# Patient Record
Sex: FEMALE | Race: WHITE | NOT HISPANIC OR LATINO | Employment: OTHER | URBAN - METROPOLITAN AREA
[De-identification: names, ages, dates, MRNs, and addresses within clinical notes are randomized per-mention and may not be internally consistent; named-entity substitution may affect disease eponyms.]

---

## 2023-06-15 LAB
ALANINE AMINOTRANSFERASE (SGPT) (U/L) IN SER/PLAS: 21 U/L (ref 7–45)
ALBUMIN (G/DL) IN SER/PLAS: 4.2 G/DL (ref 3.4–5)
ALKALINE PHOSPHATASE (U/L) IN SER/PLAS: 73 U/L (ref 33–110)
ANION GAP IN SER/PLAS: 11 MMOL/L (ref 10–20)
ASPARTATE AMINOTRANSFERASE (SGOT) (U/L) IN SER/PLAS: 16 U/L (ref 9–39)
BASOPHILS (10*3/UL) IN BLOOD BY AUTOMATED COUNT: 0.06 X10E9/L (ref 0–0.1)
BASOPHILS/100 LEUKOCYTES IN BLOOD BY AUTOMATED COUNT: 1.1 % (ref 0–2)
BILIRUBIN TOTAL (MG/DL) IN SER/PLAS: 0.4 MG/DL (ref 0–1.2)
CALCIUM (MG/DL) IN SER/PLAS: 9.2 MG/DL (ref 8.6–10.3)
CARBON DIOXIDE, TOTAL (MMOL/L) IN SER/PLAS: 27 MMOL/L (ref 21–32)
CHLORIDE (MMOL/L) IN SER/PLAS: 108 MMOL/L (ref 98–107)
CHOLESTEROL IN LDL (MG/DL) IN SER/PLAS BY DIRECT ASSAY: 171 MG/DL (ref 0–129)
CREATININE (MG/DL) IN SER/PLAS: 0.68 MG/DL (ref 0.5–1.05)
EOSINOPHILS (10*3/UL) IN BLOOD BY AUTOMATED COUNT: 0.38 X10E9/L (ref 0–0.7)
EOSINOPHILS/100 LEUKOCYTES IN BLOOD BY AUTOMATED COUNT: 7.2 % (ref 0–6)
ERYTHROCYTE DISTRIBUTION WIDTH (RATIO) BY AUTOMATED COUNT: 12.1 % (ref 11.5–14.5)
ERYTHROCYTE MEAN CORPUSCULAR HEMOGLOBIN CONCENTRATION (G/DL) BY AUTOMATED: 32.1 G/DL (ref 32–36)
ERYTHROCYTE MEAN CORPUSCULAR VOLUME (FL) BY AUTOMATED COUNT: 90 FL (ref 80–100)
ERYTHROCYTES (10*6/UL) IN BLOOD BY AUTOMATED COUNT: 4.83 X10E12/L (ref 4–5.2)
GFR FEMALE: >90 ML/MIN/1.73M2
GLUCOSE (MG/DL) IN SER/PLAS: 115 MG/DL (ref 74–99)
HEMATOCRIT (%) IN BLOOD BY AUTOMATED COUNT: 43.3 % (ref 36–46)
HEMOGLOBIN (G/DL) IN BLOOD: 13.9 G/DL (ref 12–16)
IMMATURE GRANULOCYTES/100 LEUKOCYTES IN BLOOD BY AUTOMATED COUNT: 0.2 % (ref 0–0.9)
LEUKOCYTES (10*3/UL) IN BLOOD BY AUTOMATED COUNT: 5.3 X10E9/L (ref 4.4–11.3)
LYMPHOCYTES (10*3/UL) IN BLOOD BY AUTOMATED COUNT: 1.94 X10E9/L (ref 1.2–4.8)
LYMPHOCYTES/100 LEUKOCYTES IN BLOOD BY AUTOMATED COUNT: 36.7 % (ref 13–44)
MONOCYTES (10*3/UL) IN BLOOD BY AUTOMATED COUNT: 0.49 X10E9/L (ref 0.1–1)
MONOCYTES/100 LEUKOCYTES IN BLOOD BY AUTOMATED COUNT: 9.3 % (ref 2–10)
NEUTROPHILS (10*3/UL) IN BLOOD BY AUTOMATED COUNT: 2.4 X10E9/L (ref 1.2–7.7)
NEUTROPHILS/100 LEUKOCYTES IN BLOOD BY AUTOMATED COUNT: 45.5 % (ref 40–80)
PLATELETS (10*3/UL) IN BLOOD AUTOMATED COUNT: 289 X10E9/L (ref 150–450)
POTASSIUM (MMOL/L) IN SER/PLAS: 4.6 MMOL/L (ref 3.5–5.3)
PROTEIN TOTAL: 6.7 G/DL (ref 6.4–8.2)
SODIUM (MMOL/L) IN SER/PLAS: 141 MMOL/L (ref 136–145)
UREA NITROGEN (MG/DL) IN SER/PLAS: 13 MG/DL (ref 6–23)

## 2023-06-18 LAB
NIL(NEG) CONTROL SPOT COUNT: NORMAL
PANEL A SPOT COUNT: 0
PANEL B SPOT COUNT: 1
POS CONTROL SPOT COUNT: NORMAL
T-SPOT. TB INTERPRETATION: NEGATIVE

## 2024-01-23 ENCOUNTER — HOSPITAL ENCOUNTER (OUTPATIENT)
Dept: RADIOLOGY | Facility: HOSPITAL | Age: 54
Discharge: HOME | End: 2024-01-23
Payer: COMMERCIAL

## 2024-01-23 ENCOUNTER — CLINICAL SUPPORT (OUTPATIENT)
Dept: ORTHOPEDIC SURGERY | Facility: CLINIC | Age: 54
End: 2024-01-23
Payer: COMMERCIAL

## 2024-01-23 VITALS — BODY MASS INDEX: 31.15 KG/M2 | WEIGHT: 165 LBS | HEIGHT: 61 IN

## 2024-01-23 DIAGNOSIS — S83.419A SPRAIN OF MEDIAL COLLATERAL LIGAMENT OF KNEE, INITIAL ENCOUNTER: ICD-10-CM

## 2024-01-23 DIAGNOSIS — S89.91XA RIGHT KNEE INJURY, INITIAL ENCOUNTER: ICD-10-CM

## 2024-01-23 PROCEDURE — 99213 OFFICE O/P EST LOW 20 MIN: CPT

## 2024-01-23 PROCEDURE — 73564 X-RAY EXAM KNEE 4 OR MORE: CPT | Mod: RIGHT SIDE | Performed by: RADIOLOGY

## 2024-01-23 PROCEDURE — 73564 X-RAY EXAM KNEE 4 OR MORE: CPT | Mod: RT

## 2024-01-23 NOTE — PROGRESS NOTES
HPI  Faye Fajardo is a 53 y.o. female  in office today for   Chief Complaint   Patient presents with    Right Knee - Pain, Edema     Patient slipped and fell down into a ditch yesterday   .  she has been doing RICE and took some motrin for pain.  She also has a pull on brace which she states is painful. Pain mainly on the medial side of her knee and feels like the knee may go out.      Medication  No current outpatient medications on file prior to visit.     No current facility-administered medications on file prior to visit.       Physical Exam  Constitutional: well developed, well nourished female in no acute distress  Psychiatric: normal mood, appropriate affect  Eyes: sclera anicteric  HENT: normocephalic/atraumatic  CV: regular rate and rhythm   Respiratory: non labored breathing  Integumentary: no rash  Neurological: moves all extremities    Right Knee Exam     Tenderness   The patient is experiencing tenderness in the MCL.    Range of Motion   Extension:  0   Flexion:  120     Tests   Martin:  Medial - negative Lateral - negative  Varus: negative Valgus: negative  Drawer:  Anterior - negative    Posterior - negative  Patellar apprehension: negative    Other   Erythema: absent  Scars: absent  Sensation: normal  Swelling: moderate  Effusion: no effusion present              Imaging/Lab:  X-rays were taken today which were reviewed by myself and read by myself and show no acute fracture or dislocation      Assessment  Assessment: Right MCL sprain    Plan  Plan:  History, physical exam, and imaging were reviewed with patient. Placed into a brace for compression and stability, crutches to be nonweightbearing as she has pain.  As pain improves at rest, discussed progressive weightbearing as tolerated.  She should continue with RICE and antiinflammatories  Follow Up: 2-3 weeks to reassess    All questions were answered for the patient prior to end of exam and patient addressed their understanding.    Maggie  PAT [place]  01/23/24

## 2024-04-30 ENCOUNTER — OFFICE VISIT (OUTPATIENT)
Dept: UROLOGY | Facility: CLINIC | Age: 54
End: 2024-04-30
Payer: COMMERCIAL

## 2024-04-30 VITALS — DIASTOLIC BLOOD PRESSURE: 78 MMHG | SYSTOLIC BLOOD PRESSURE: 128 MMHG

## 2024-04-30 DIAGNOSIS — N39.3 STRESS INCONTINENCE: ICD-10-CM

## 2024-04-30 DIAGNOSIS — N39.41 URGE INCONTINENCE: Primary | ICD-10-CM

## 2024-04-30 LAB
POC BILIRUBIN, URINE: NEGATIVE
POC BLOOD, URINE: NEGATIVE
POC GLUCOSE, URINE: NEGATIVE MG/DL
POC KETONES, URINE: NEGATIVE MG/DL
POC LEUKOCYTES, URINE: NEGATIVE
POC NITRITE,URINE: NEGATIVE
POC PH, URINE: 5.5 PH
POC PROTEIN, URINE: NEGATIVE MG/DL
POC SPECIFIC GRAVITY, URINE: >=1.03
POC UROBILINOGEN, URINE: 0.2 EU/DL

## 2024-04-30 PROCEDURE — 81003 URINALYSIS AUTO W/O SCOPE: CPT | Performed by: STUDENT IN AN ORGANIZED HEALTH CARE EDUCATION/TRAINING PROGRAM

## 2024-04-30 PROCEDURE — 99205 OFFICE O/P NEW HI 60 MIN: CPT | Performed by: STUDENT IN AN ORGANIZED HEALTH CARE EDUCATION/TRAINING PROGRAM

## 2024-04-30 RX ORDER — RISANKIZUMAB-RZAA 150 MG/ML
INJECTION SUBCUTANEOUS
COMMUNITY
Start: 2022-06-01

## 2024-04-30 NOTE — Clinical Note
May 3, 2024       No Recipients    Patient: Faye Fajardo   YOB: 1970   Date of Visit: 4/30/2024       Dear Dr. Galan Recipients:    Thank you for referring Faye Fajardo to me for evaluation. Below are my notes for this consultation.  If you have questions, please do not hesitate to call me. I look forward to following your patient along with you.       Sincerely,     Wai Massey MD      CC:   No Recipients  ______________________________________________________________________________________    HISTORY OF PRESENT ILLNESS:  Faye Fajardo is a 53 y.o. female who presents today as a new patient for overactive bladder symptoms. She states the stress incontinence is more bothersome. She reports the voids every hour. She has increased urgency. She has a history of culture proven infections, but her last one was a few months ago. She does not have a history of breast cancer, but there is a familial one. She has had pain with sexual intercourse in the past. She reports she also has dryness with sex. She has a history of miscarriages., she has had one child vaginally. She denies any feelings of prolapse. She reports she had sympotms of a UTI but they have resolved without treatment. She is not on oxybutynin.          Past Medical History  She has a past medical history of Corneal transplant status.    Surgical History  She has a past surgical history that includes Other surgical history (06/22/2021) and Other surgical history (06/22/2021).     Social History  She reports that she has never smoked. She has never used smokeless tobacco. No history on file for alcohol use and drug use.    Family History  No family history on file.     Allergies  Augmentin [amoxicillin-pot clavulanate]        A comprehensive 10+ review of systems was negative except for: see hpi                    PHYSICAL EXAMINATION:  BP Readings from Last 3 Encounters:   04/30/24 128/78   07/29/21 106/74   07/08/20 122/80      Wt  "Readings from Last 3 Encounters:   01/23/24 74.8 kg (165 lb)   07/29/21 83.9 kg (185 lb)   07/13/21 68 kg (150 lb)      BMI: Estimated body mass index is 31.18 kg/m² as calculated from the following:    Height as of 1/23/24: 1.549 m (5' 1\").    Weight as of 1/23/24: 74.8 kg (165 lb).  BSA: Estimated body surface area is 1.79 meters squared as calculated from the following:    Height as of 1/23/24: 1.549 m (5' 1\").    Weight as of 1/23/24: 74.8 kg (165 lb).  HEENT: Normocephalic, atraumatic, PER EOMI, nonicteric, trachea normal, thyroid normal, oropharynx normal.  CARDIAC: regular rate & rhythm, S1 & S2 normal.  No heaves, thrills, gallops or murmurs.  LUNGS: Clear to auscultation, no spinal or CV tenderness.  EXTREMITIES: No evidence of cyanosis, clubbing or edema.      Pelvic:  Genitourinary:  normal external genitalia, Bartholin's glands negative, [place]'s glands negative  Urethra   normal meatus, non-tender, no periurethral mass  Vaginal mucosa  normal  Adnexae  negative nontender, no masses  Atrophy negative    CST negative  Pelvic floor muscle contraction  4/5    POP-Q (in supine position):        Aa -3     Ba -3     C -8              gh 3     pb 3     tvl 8              Ap -3     Bp -3     D -8    Rectal: no hemorrhoids, fissures or masses      IMPRESSION AND PLAN:            Assessment:  52 yo female who presents as a new patient for ADEN,     ADEN, stress dominant   -discussed mechanism of UUI and SALAZAR, and treatment options for both including PFT, pessary, sling for SALAZAR and PFT, pharmacotherapy and third-line therapy for OAB      Bulkamid is associated with slightly less success rate of a sling about 60 to 70% of women having >90% improvement. However, there seems to be similar long-term success compared to sling with fewer side-effects. Main AE is urinary retention which resolves within 24 hours of using a 10-12 Polish catheter.  I discussed that if she still has some leakage after her procedure, she could " perform another injection within 4 weeks and this procedure being performed in the office.        We discussed the sling procedure in depth with her there is a long-term success rate of 70-80% complete continence and up to 90% significant improvement up to 10 years after surgery, though the sling is meant to last a lifetime, there is a <5% risk of subsequent surgery, either revision or excision within 9 years. The major complications include bladder perforation with sling placement <1%, retention requiring sling lysis 1-3%, transient retention requiring 2-3 days of catheter drainage 33%, and mesh erosion 1-3%.      UDS ordered    Will try PT as well     Informational handouts provided    Tentatively scheduled for sling in September    All questions and concerns were answered and addressed.  The patient expressed understanding and agrees with the plan.     Wai Massey MD    Scribe Attestation  By signing my name below, I, La Nena Frias, [place]   attest that this documentation has been prepared under the direction and in the presence of Wai Massey MD.

## 2024-04-30 NOTE — PROGRESS NOTES
"HISTORY OF PRESENT ILLNESS:  Faye Fajardo is a 53 y.o. female who presents today as a new patient for overactive bladder symptoms. She states the stress incontinence is more bothersome. She reports the voids every hour. She has increased urgency. She has a history of culture proven infections, but her last one was a few months ago. She does not have a history of breast cancer, but there is a familial one. She has had pain with sexual intercourse in the past. She reports she also has dryness with sex. She has a history of miscarriages., she has had one child vaginally. She denies any feelings of prolapse. She reports she had sympotms of a UTI but they have resolved without treatment. She is not on oxybutynin.          Past Medical History  She has a past medical history of Corneal transplant status.    Surgical History  She has a past surgical history that includes Other surgical history (06/22/2021) and Other surgical history (06/22/2021).     Social History  She reports that she has never smoked. She has never used smokeless tobacco. No history on file for alcohol use and drug use.    Family History  No family history on file.     Allergies  Augmentin [amoxicillin-pot clavulanate]        A comprehensive 10+ review of systems was negative except for: see hpi                    PHYSICAL EXAMINATION:  BP Readings from Last 3 Encounters:   04/30/24 128/78   07/29/21 106/74   07/08/20 122/80      Wt Readings from Last 3 Encounters:   01/23/24 74.8 kg (165 lb)   07/29/21 83.9 kg (185 lb)   07/13/21 68 kg (150 lb)      BMI: Estimated body mass index is 31.18 kg/m² as calculated from the following:    Height as of 1/23/24: 1.549 m (5' 1\").    Weight as of 1/23/24: 74.8 kg (165 lb).  BSA: Estimated body surface area is 1.79 meters squared as calculated from the following:    Height as of 1/23/24: 1.549 m (5' 1\").    Weight as of 1/23/24: 74.8 kg (165 lb).  HEENT: Normocephalic, atraumatic, PER EOMI, nonicteric, trachea " normal, thyroid normal, oropharynx normal.  CARDIAC: regular rate & rhythm, S1 & S2 normal.  No heaves, thrills, gallops or murmurs.  LUNGS: Clear to auscultation, no spinal or CV tenderness.  EXTREMITIES: No evidence of cyanosis, clubbing or edema.      Pelvic:  Genitourinary:  normal external genitalia, Bartholin's glands negative, [place]'s glands negative  Urethra   normal meatus, non-tender, no periurethral mass  Vaginal mucosa  normal  Adnexae  negative nontender, no masses  Atrophy negative    CST negative  Pelvic floor muscle contraction  4/5    POP-Q (in supine position):        Aa -3     Ba -3     C -8              gh 3     pb 3     tvl 8              Ap -3     Bp -3     D -8    Rectal: no hemorrhoids, fissures or masses      IMPRESSION AND PLAN:            Assessment:  52 yo female who presents as a new patient for ADEN,     ADEN, stress dominant   -discussed mechanism of UUI and SALAZAR, and treatment options for both including PFT, pessary, sling for SALAZAR and PFT, pharmacotherapy and third-line therapy for OAB      Bulkamid is associated with slightly less success rate of a sling about 60 to 70% of women having >90% improvement. However, there seems to be similar long-term success compared to sling with fewer side-effects. Main AE is urinary retention which resolves within 24 hours of using a 10-12 [place] catheter.  I discussed that if she still has some leakage after her procedure, she could perform another injection within 4 weeks and this procedure being performed in the office.        We discussed the sling procedure in depth with her there is a long-term success rate of 70-80% complete continence and up to 90% significant improvement up to 10 years after surgery, though the sling is meant to last a lifetime, there is a <5% risk of subsequent surgery, either revision or excision within 9 years. The major complications include bladder perforation with sling placement <1%, retention requiring sling lysis 1-3%,  transient retention requiring 2-3 days of catheter drainage 33%, and mesh erosion 1-3%.      UDS ordered    Will try PT as well     Informational handouts provided    Tentatively scheduled for sling in September    All questions and concerns were answered and addressed.  The patient expressed understanding and agrees with the plan.     Wai Massey MD    Scribe Attestation  By signing my name below, I, La Nena Juarezus, Scribe   attest that this documentation has been prepared under the direction and in the presence of Wai Massey MD.

## 2024-05-03 PROBLEM — N39.3 STRESS INCONTINENCE: Status: ACTIVE | Noted: 2024-04-30

## 2024-05-03 RX ORDER — ACETAMINOPHEN 325 MG/1
975 TABLET ORAL ONCE
OUTPATIENT
Start: 2024-05-03 | End: 2024-05-03

## 2024-05-03 RX ORDER — CELECOXIB 400 MG/1
400 CAPSULE ORAL ONCE
OUTPATIENT
Start: 2024-05-03 | End: 2024-05-03

## 2024-05-03 RX ORDER — CEFAZOLIN SODIUM 2 G/100ML
2 INJECTION, SOLUTION INTRAVENOUS ONCE
OUTPATIENT
Start: 2024-05-03 | End: 2024-05-03

## 2024-05-21 ENCOUNTER — OFFICE VISIT (OUTPATIENT)
Dept: UROLOGY | Facility: CLINIC | Age: 54
End: 2024-05-21
Payer: COMMERCIAL

## 2024-05-21 ENCOUNTER — PROCEDURE VISIT (OUTPATIENT)
Dept: UROLOGY | Facility: CLINIC | Age: 54
End: 2024-05-21
Payer: COMMERCIAL

## 2024-05-21 DIAGNOSIS — N32.81 OAB (OVERACTIVE BLADDER): Primary | ICD-10-CM

## 2024-05-21 DIAGNOSIS — R39.15 URINARY URGENCY: ICD-10-CM

## 2024-05-21 DIAGNOSIS — N39.3 SUI (STRESS URINARY INCONTINENCE, FEMALE): ICD-10-CM

## 2024-05-21 PROCEDURE — 51728 CYSTOMETROGRAM W/VP: CPT | Performed by: STUDENT IN AN ORGANIZED HEALTH CARE EDUCATION/TRAINING PROGRAM

## 2024-05-21 PROCEDURE — 51741 ELECTRO-UROFLOWMETRY FIRST: CPT | Performed by: STUDENT IN AN ORGANIZED HEALTH CARE EDUCATION/TRAINING PROGRAM

## 2024-05-21 PROCEDURE — 51797 INTRAABDOMINAL PRESSURE TEST: CPT | Performed by: STUDENT IN AN ORGANIZED HEALTH CARE EDUCATION/TRAINING PROGRAM

## 2024-05-21 PROCEDURE — 99214 OFFICE O/P EST MOD 30 MIN: CPT | Performed by: STUDENT IN AN ORGANIZED HEALTH CARE EDUCATION/TRAINING PROGRAM

## 2024-05-21 PROCEDURE — 51784 ANAL/URINARY MUSCLE STUDY: CPT | Performed by: STUDENT IN AN ORGANIZED HEALTH CARE EDUCATION/TRAINING PROGRAM

## 2024-05-21 PROCEDURE — 51729 CYSTOMETROGRAM W/VP&UP: CPT | Performed by: STUDENT IN AN ORGANIZED HEALTH CARE EDUCATION/TRAINING PROGRAM

## 2024-05-21 RX ORDER — MIRABEGRON 50 MG/1
50 TABLET, EXTENDED RELEASE ORAL DAILY
Qty: 30 TABLET | Refills: 11 | Status: SHIPPED | OUTPATIENT
Start: 2024-05-21 | End: 2025-05-16

## 2024-05-21 NOTE — PROGRESS NOTES
Urodynamic Study:  Faye Fajardo identified using 2 forms of identification. A timeout was completed, patient is in the correct position and all safety precautions have been taken.   Risks, Benefits and Alternatives:  Risks, benefits and alternatives were discussed in detail. The patient appears to understand and agrees to proceed. Faye Fajardo has completed, signed and dated the procedure consent form.    Uroflow completed.  Using sterile technique, a 7fr Air Charge Catheter was inserted into the bladder. Rectal catheter inserted approximately 15cm  Bladder filled with normal saline at a rate of 49.9ml/min  130 ml of normal saline instilled in bladder prior to voiding.   Results:  Post void residual  (PVR)  volume of 50 ml.      Patient here for urodynamic study. Discussed procedure. Bladder diary reviewed. Patient has OAB symptoms. She has stress incontinence. She complains of having frequency and urgency, voiding every hour. She does not feel a bulge. Performed UDS without difficulty. I was not able to do a cough stress with her today as she had urgency and then started leaking. She does however state that she does leak when she coughs and laughs. Instructed patient to increase fluid intake.

## 2024-05-21 NOTE — PROGRESS NOTES
"HISTORY OF PRESENT ILLNESS:  Faye Fajardo is a 53 y.o. female who presents today for a follow up visit. She has not gone to physical therapy, she is not interested in this,.  UDS demonstrates detrusor overactivity and does not demonstrate stress incontinence.  She does endorse leakage with stress incontinence.  She is also bothered by the urge, she tried oxybutynin which caused severe side effects.         Past Medical History  She has a past medical history of Corneal transplant status.    Surgical History  She has a past surgical history that includes Other surgical history (06/22/2021) and Other surgical history (06/22/2021).     Social History  She reports that she has never smoked. She has never used smokeless tobacco. No history on file for alcohol use and drug use.    Family History  No family history on file.     Allergies  Augmentin [amoxicillin-pot clavulanate]      A comprehensive 10+ review of systems was negative except for: see hpi                          PHYSICAL EXAMINATION:  BP Readings from Last 3 Encounters:   04/30/24 128/78   07/29/21 106/74   07/08/20 122/80      Wt Readings from Last 3 Encounters:   01/23/24 74.8 kg (165 lb)   07/29/21 83.9 kg (185 lb)   07/13/21 68 kg (150 lb)      BMI: Estimated body mass index is 31.18 kg/m² as calculated from the following:    Height as of 1/23/24: 1.549 m (5' 1\").    Weight as of 1/23/24: 74.8 kg (165 lb).  BSA: Estimated body surface area is 1.79 meters squared as calculated from the following:    Height as of 1/23/24: 1.549 m (5' 1\").    Weight as of 1/23/24: 74.8 kg (165 lb).  HEENT: Normocephalic, atraumatic, PER EOMI, nonicteric, trachea normal, thyroid normal, oropharynx normal.  CARDIAC: regular rate & rhythm, S1 & S2 normal.  No heaves, thrills, gallops or murmurs.  LUNGS: Clear to auscultation, no spinal or CV tenderness.  EXTREMITIES: No evidence of cyanosis, clubbing or edema.               Assessment:  54 yo female who presents for ADEN, "      ADEN, stress dominant     UDS shows chata detrusor overactivity with leakage,    Has failed oxybutynin we will try Myrbetriq    She is tentatively scheduled for sling in September, will hold this for now    We discussed third-line therapy as well    We discussed botox vs sacral neuromodulation: both have similar efficacy 80% patients reports >50% improvement, botox associated with 5% risk of incomplete emptying, increase in UTI and will require re-injection in 6-9 months; and as early as 3 months. SNM is a staged procedure, 2 weeks apart, consisting first of lead implantation then internalization of IPG if there is improvement. Interstim is associated with lead migration, explantation, infection and bleeding, though risks are all <5%. We also discussed PTNS which is associated with success rates comparable to medical therapy but without side-effects without significant major morbidity.     Follow-up in 4 to 6-week      All questions and concerns were answered and addressed.  The patient expressed understanding and agrees with the plan.     Wai Massey MD    Scribe Attestation  By signing my name below, I, La Nena Frias, Scribe   attest that this documentation has been prepared under the direction and in the presence of Wai Massey MD.

## 2024-07-02 ENCOUNTER — APPOINTMENT (OUTPATIENT)
Dept: UROLOGY | Facility: CLINIC | Age: 54
End: 2024-07-02
Payer: COMMERCIAL

## 2024-07-02 DIAGNOSIS — N32.81 OAB (OVERACTIVE BLADDER): Primary | ICD-10-CM

## 2024-07-02 PROCEDURE — 99442 PR PHYS/QHP TELEPHONE EVALUATION 11-20 MIN: CPT | Performed by: STUDENT IN AN ORGANIZED HEALTH CARE EDUCATION/TRAINING PROGRAM

## 2024-07-02 RX ORDER — FESOTERODINE FUMARATE 8 MG/1
8 TABLET, FILM COATED, EXTENDED RELEASE ORAL DAILY
Qty: 30 TABLET | Refills: 11 | Status: SHIPPED | OUTPATIENT
Start: 2024-07-02 | End: 2025-07-02

## 2024-07-02 NOTE — PROGRESS NOTES
Virtual or Telephone Consent    A telephone visit (audio only) between the patient (at the originating site) and the provider (at the distant site) was utilized to provide this telehealth service.   Verbal consent was requested and obtained from Faye Fajardo on this date, 07/05/24 for a telehealth visit.       HISTORY OF PRESENT ILLNESS:  Faye Fajardo is a 53 y.o. female who presents today for a virtual follow up visit. She reports it took 2 to 3 weeks for her to get her Myrbetriq filled and that it cost her over 300 dollars for 90 days.          Past Medical History  She has a past medical history of Corneal transplant status.    Surgical History  She has a past surgical history that includes Other surgical history (06/22/2021) and Other surgical history (06/22/2021).     Social History  She reports that she has never smoked. She has never used smokeless tobacco. No history on file for alcohol use and drug use.    Family History  No family history on file.     Allergies  Augmentin [amoxicillin-pot clavulanate]      A comprehensive 10+ review of systems was negative except for: see hpi                    Assessment:  52 yo female who presents for ADEN,      ADEN, stress dominant      UDS shows detrusor overactivity with leakage,     Has failed oxybutynin, myrbetriq worked somewhat but was too expensive, try fesoterodine     She is tentatively scheduled for sling in September, may hold off on sling if fesoterodine works         Follow-up in 8 weeks     I spent a total of 11 minutes speaking with the patient on the telephone    All questions and concerns were answered and addressed.  The patient expressed understanding and agrees with the plan.     Wai Massey MD    Scribe Attestation  By signing my name below, ILa Nena Scribe   attest that this documentation has been prepared under the direction and in the presence of Wai Massey MD.

## 2024-09-13 ENCOUNTER — CLINICAL SUPPORT (OUTPATIENT)
Dept: PREADMISSION TESTING | Facility: HOSPITAL | Age: 54
End: 2024-09-13
Payer: COMMERCIAL

## 2024-09-13 RX ORDER — CYANOCOBALAMIN (VITAMIN B-12) 250 MCG
250 TABLET ORAL 2 TIMES WEEKLY
COMMUNITY

## 2024-09-13 NOTE — PREPROCEDURE INSTRUCTIONS
No outpatient medications have been marked as taking for the 9/13/24 encounter (Clinical Support) with [facility].                       NPO Instructions:    Nothing to eat or drink after midnight  Hydrate well the day before  No medications morning of surgery    Additional Instructions:     Wear  comfortable loose fitting clothing  Do not use moisturizers, creams, lotions or perfume  All jewelry and valuables should be left at home  Shower morning of surgery deodorant   Call with any [phone]

## 2024-09-17 ENCOUNTER — ANESTHESIA EVENT (OUTPATIENT)
Dept: OPERATING ROOM | Facility: HOSPITAL | Age: 54
End: 2024-09-17
Payer: COMMERCIAL

## 2024-09-18 ENCOUNTER — APPOINTMENT (OUTPATIENT)
Dept: CARDIOLOGY | Facility: HOSPITAL | Age: 54
End: 2024-09-18
Payer: COMMERCIAL

## 2024-09-18 ENCOUNTER — HOSPITAL ENCOUNTER (OUTPATIENT)
Facility: HOSPITAL | Age: 54
Setting detail: OUTPATIENT SURGERY
Discharge: HOME | End: 2024-09-18
Attending: STUDENT IN AN ORGANIZED HEALTH CARE EDUCATION/TRAINING PROGRAM | Admitting: STUDENT IN AN ORGANIZED HEALTH CARE EDUCATION/TRAINING PROGRAM
Payer: COMMERCIAL

## 2024-09-18 ENCOUNTER — PHARMACY VISIT (OUTPATIENT)
Dept: PHARMACY | Facility: CLINIC | Age: 54
End: 2024-09-18
Payer: COMMERCIAL

## 2024-09-18 ENCOUNTER — ANESTHESIA (OUTPATIENT)
Dept: OPERATING ROOM | Facility: HOSPITAL | Age: 54
End: 2024-09-18
Payer: COMMERCIAL

## 2024-09-18 VITALS
SYSTOLIC BLOOD PRESSURE: 108 MMHG | DIASTOLIC BLOOD PRESSURE: 74 MMHG | TEMPERATURE: 97.6 F | HEART RATE: 58 BPM | WEIGHT: 165 LBS | BODY MASS INDEX: 31.15 KG/M2 | HEIGHT: 61 IN | RESPIRATION RATE: 11 BRPM | OXYGEN SATURATION: 95 %

## 2024-09-18 DIAGNOSIS — G89.18 POSTOPERATIVE PAIN: ICD-10-CM

## 2024-09-18 DIAGNOSIS — N39.3 STRESS INCONTINENCE: ICD-10-CM

## 2024-09-18 DIAGNOSIS — N39.41 URGE INCONTINENCE: Primary | ICD-10-CM

## 2024-09-18 PROBLEM — R11.2 PONV (POSTOPERATIVE NAUSEA AND VOMITING): Status: ACTIVE | Noted: 2024-09-18

## 2024-09-18 PROBLEM — Z98.890 PONV (POSTOPERATIVE NAUSEA AND VOMITING): Status: ACTIVE | Noted: 2024-09-18

## 2024-09-18 LAB
ANION GAP SERPL CALC-SCNC: 8 MMOL/L (ref 10–20)
ATRIAL RATE: 70 BPM
BUN SERPL-MCNC: 10 MG/DL (ref 6–23)
CALCIUM SERPL-MCNC: 8.7 MG/DL (ref 8.6–10.3)
CHLORIDE SERPL-SCNC: 108 MMOL/L (ref 98–107)
CO2 SERPL-SCNC: 26 MMOL/L (ref 21–32)
CREAT SERPL-MCNC: 0.65 MG/DL (ref 0.5–1.05)
EGFRCR SERPLBLD CKD-EPI 2021: >90 ML/MIN/1.73M*2
ERYTHROCYTE [DISTWIDTH] IN BLOOD BY AUTOMATED COUNT: 11.9 % (ref 11.5–14.5)
GLUCOSE SERPL-MCNC: 120 MG/DL (ref 74–99)
HCT VFR BLD AUTO: 42.3 % (ref 36–46)
HGB BLD-MCNC: 14.3 G/DL (ref 12–16)
MCH RBC QN AUTO: 29.5 PG (ref 26–34)
MCHC RBC AUTO-ENTMCNC: 33.8 G/DL (ref 32–36)
MCV RBC AUTO: 87 FL (ref 80–100)
NRBC BLD-RTO: 0 /100 WBCS (ref 0–0)
P AXIS: 30 DEGREES
PLATELET # BLD AUTO: 303 X10*3/UL (ref 150–450)
POTASSIUM SERPL-SCNC: 4.1 MMOL/L (ref 3.5–5.3)
PR INTERVAL: 153 MS
Q ONSET: 253 MS
QRS COUNT: 11 BEATS
QRS DURATION: 89 MS
QT INTERVAL: 394 MS
QTC CALCULATION(BAZETT): 429 MS
QTC FREDERICIA: 416 MS
R AXIS: -25 DEGREES
RBC # BLD AUTO: 4.84 X10*6/UL (ref 4–5.2)
SODIUM SERPL-SCNC: 138 MMOL/L (ref 136–145)
T AXIS: 13 DEGREES
T OFFSET: 450 MS
VENTRICULAR RATE: 71 BPM
WBC # BLD AUTO: 4.4 X10*3/UL (ref 4.4–11.3)

## 2024-09-18 PROCEDURE — 2500000005 HC RX 250 GENERAL PHARMACY W/O HCPCS: Performed by: STUDENT IN AN ORGANIZED HEALTH CARE EDUCATION/TRAINING PROGRAM

## 2024-09-18 PROCEDURE — 2500000004 HC RX 250 GENERAL PHARMACY W/ HCPCS (ALT 636 FOR OP/ED): Performed by: ANESTHESIOLOGY

## 2024-09-18 PROCEDURE — 82374 ASSAY BLOOD CARBON DIOXIDE: CPT | Performed by: ANESTHESIOLOGY

## 2024-09-18 PROCEDURE — 7100000010 HC PHASE TWO TIME - EACH INCREMENTAL 1 MINUTE: Performed by: STUDENT IN AN ORGANIZED HEALTH CARE EDUCATION/TRAINING PROGRAM

## 2024-09-18 PROCEDURE — 36415 COLL VENOUS BLD VENIPUNCTURE: CPT | Performed by: ANESTHESIOLOGY

## 2024-09-18 PROCEDURE — 3600000008 HC OR TIME - EACH INCREMENTAL 1 MINUTE - PROCEDURE LEVEL THREE: Performed by: STUDENT IN AN ORGANIZED HEALTH CARE EDUCATION/TRAINING PROGRAM

## 2024-09-18 PROCEDURE — C1771 REP DEV, URINARY, W/SLING: HCPCS | Performed by: STUDENT IN AN ORGANIZED HEALTH CARE EDUCATION/TRAINING PROGRAM

## 2024-09-18 PROCEDURE — 2500000004 HC RX 250 GENERAL PHARMACY W/ HCPCS (ALT 636 FOR OP/ED): Performed by: NURSE ANESTHETIST, CERTIFIED REGISTERED

## 2024-09-18 PROCEDURE — 57288 REPAIR BLADDER DEFECT: CPT | Performed by: STUDENT IN AN ORGANIZED HEALTH CARE EDUCATION/TRAINING PROGRAM

## 2024-09-18 PROCEDURE — 7100000009 HC PHASE TWO TIME - INITIAL BASE CHARGE: Performed by: STUDENT IN AN ORGANIZED HEALTH CARE EDUCATION/TRAINING PROGRAM

## 2024-09-18 PROCEDURE — 3700000002 HC GENERAL ANESTHESIA TIME - EACH INCREMENTAL 1 MINUTE: Performed by: STUDENT IN AN ORGANIZED HEALTH CARE EDUCATION/TRAINING PROGRAM

## 2024-09-18 PROCEDURE — 96372 THER/PROPH/DIAG INJ SC/IM: CPT | Performed by: NURSE ANESTHETIST, CERTIFIED REGISTERED

## 2024-09-18 PROCEDURE — 85027 COMPLETE CBC AUTOMATED: CPT | Performed by: ANESTHESIOLOGY

## 2024-09-18 PROCEDURE — 2500000001 HC RX 250 WO HCPCS SELF ADMINISTERED DRUGS (ALT 637 FOR MEDICARE OP): Performed by: STUDENT IN AN ORGANIZED HEALTH CARE EDUCATION/TRAINING PROGRAM

## 2024-09-18 PROCEDURE — 2780000003 HC OR 278 NO HCPCS: Performed by: STUDENT IN AN ORGANIZED HEALTH CARE EDUCATION/TRAINING PROGRAM

## 2024-09-18 PROCEDURE — 2500000004 HC RX 250 GENERAL PHARMACY W/ HCPCS (ALT 636 FOR OP/ED): Mod: JZ | Performed by: STUDENT IN AN ORGANIZED HEALTH CARE EDUCATION/TRAINING PROGRAM

## 2024-09-18 PROCEDURE — 7100000001 HC RECOVERY ROOM TIME - INITIAL BASE CHARGE: Performed by: STUDENT IN AN ORGANIZED HEALTH CARE EDUCATION/TRAINING PROGRAM

## 2024-09-18 PROCEDURE — 2720000007 HC OR 272 NO HCPCS: Performed by: STUDENT IN AN ORGANIZED HEALTH CARE EDUCATION/TRAINING PROGRAM

## 2024-09-18 PROCEDURE — 93010 ELECTROCARDIOGRAM REPORT: CPT | Performed by: INTERNAL MEDICINE

## 2024-09-18 PROCEDURE — RXMED WILLOW AMBULATORY MEDICATION CHARGE

## 2024-09-18 PROCEDURE — 7100000002 HC RECOVERY ROOM TIME - EACH INCREMENTAL 1 MINUTE: Performed by: STUDENT IN AN ORGANIZED HEALTH CARE EDUCATION/TRAINING PROGRAM

## 2024-09-18 PROCEDURE — 3700000001 HC GENERAL ANESTHESIA TIME - INITIAL BASE CHARGE: Performed by: STUDENT IN AN ORGANIZED HEALTH CARE EDUCATION/TRAINING PROGRAM

## 2024-09-18 PROCEDURE — 3600000003 HC OR TIME - INITIAL BASE CHARGE - PROCEDURE LEVEL THREE: Performed by: STUDENT IN AN ORGANIZED HEALTH CARE EDUCATION/TRAINING PROGRAM

## 2024-09-18 PROCEDURE — 2500000005 HC RX 250 GENERAL PHARMACY W/O HCPCS: Performed by: NURSE ANESTHETIST, CERTIFIED REGISTERED

## 2024-09-18 PROCEDURE — 51702 INSERT TEMP BLADDER CATH: CPT | Mod: CCI

## 2024-09-18 PROCEDURE — 2500000001 HC RX 250 WO HCPCS SELF ADMINISTERED DRUGS (ALT 637 FOR MEDICARE OP): Performed by: ANESTHESIOLOGY

## 2024-09-18 PROCEDURE — 93005 ELECTROCARDIOGRAM TRACING: CPT

## 2024-09-18 RX ORDER — SODIUM CITRATE AND CITRIC ACID MONOHYDRATE 334; 500 MG/5ML; MG/5ML
30 SOLUTION ORAL ONCE
Status: COMPLETED | OUTPATIENT
Start: 2024-09-18 | End: 2024-09-18

## 2024-09-18 RX ORDER — LIDOCAINE HYDROCHLORIDE AND EPINEPHRINE 10; 10 MG/ML; UG/ML
INJECTION, SOLUTION INFILTRATION; PERINEURAL AS NEEDED
Status: DISCONTINUED | OUTPATIENT
Start: 2024-09-18 | End: 2024-09-18 | Stop reason: HOSPADM

## 2024-09-18 RX ORDER — CEFAZOLIN SODIUM 2 G/100ML
2 INJECTION, SOLUTION INTRAVENOUS EVERY 8 HOURS
Status: DISCONTINUED | OUTPATIENT
Start: 2024-09-18 | End: 2024-09-18 | Stop reason: HOSPADM

## 2024-09-18 RX ORDER — POLYETHYLENE GLYCOL 3350 17 G/17G
17 POWDER, FOR SOLUTION ORAL DAILY
Qty: 510 G | Refills: 0 | Status: SHIPPED | OUTPATIENT
Start: 2024-09-18 | End: 2024-10-18

## 2024-09-18 RX ORDER — ACETAMINOPHEN 325 MG/1
975 TABLET ORAL ONCE
Status: COMPLETED | OUTPATIENT
Start: 2024-09-18 | End: 2024-09-18

## 2024-09-18 RX ORDER — PHENYLEPHRINE HYDROCHLORIDE 10 MG/ML
INJECTION INTRAVENOUS AS NEEDED
Status: DISCONTINUED | OUTPATIENT
Start: 2024-09-18 | End: 2024-09-18

## 2024-09-18 RX ORDER — MIDAZOLAM HYDROCHLORIDE 1 MG/ML
INJECTION, SOLUTION INTRAMUSCULAR; INTRAVENOUS AS NEEDED
Status: DISCONTINUED | OUTPATIENT
Start: 2024-09-18 | End: 2024-09-18

## 2024-09-18 RX ORDER — CELECOXIB 200 MG/1
400 CAPSULE ORAL ONCE
Status: COMPLETED | OUTPATIENT
Start: 2024-09-18 | End: 2024-09-18

## 2024-09-18 RX ORDER — LIDOCAINE HCL/PF 100 MG/5ML
SYRINGE (ML) INTRAVENOUS AS NEEDED
Status: DISCONTINUED | OUTPATIENT
Start: 2024-09-18 | End: 2024-09-18

## 2024-09-18 RX ORDER — DOCUSATE SODIUM 100 MG/1
100 CAPSULE, LIQUID FILLED ORAL 2 TIMES DAILY
Qty: 60 CAPSULE | Refills: 0 | Status: SHIPPED | OUTPATIENT
Start: 2024-09-18 | End: 2024-10-18

## 2024-09-18 RX ORDER — KETOROLAC TROMETHAMINE 30 MG/ML
INJECTION, SOLUTION INTRAMUSCULAR; INTRAVENOUS AS NEEDED
Status: DISCONTINUED | OUTPATIENT
Start: 2024-09-18 | End: 2024-09-18

## 2024-09-18 RX ORDER — METOCLOPRAMIDE HYDROCHLORIDE 5 MG/ML
10 INJECTION INTRAMUSCULAR; INTRAVENOUS ONCE
Status: COMPLETED | OUTPATIENT
Start: 2024-09-18 | End: 2024-09-18

## 2024-09-18 RX ORDER — HYDROMORPHONE HYDROCHLORIDE 0.2 MG/ML
0.2 INJECTION INTRAMUSCULAR; INTRAVENOUS; SUBCUTANEOUS EVERY 5 MIN PRN
Status: DISCONTINUED | OUTPATIENT
Start: 2024-09-18 | End: 2024-09-18 | Stop reason: HOSPADM

## 2024-09-18 RX ORDER — TRAMADOL HYDROCHLORIDE 50 MG/1
50 TABLET ORAL EVERY 6 HOURS PRN
Qty: 3 TABLET | Refills: 0 | Status: SHIPPED | OUTPATIENT
Start: 2024-09-18

## 2024-09-18 RX ORDER — FAMOTIDINE 10 MG/ML
20 INJECTION INTRAVENOUS ONCE
Status: COMPLETED | OUTPATIENT
Start: 2024-09-18 | End: 2024-09-18

## 2024-09-18 RX ORDER — FENTANYL CITRATE 50 UG/ML
INJECTION, SOLUTION INTRAMUSCULAR; INTRAVENOUS AS NEEDED
Status: DISCONTINUED | OUTPATIENT
Start: 2024-09-18 | End: 2024-09-18

## 2024-09-18 RX ORDER — SODIUM CHLORIDE, SODIUM LACTATE, POTASSIUM CHLORIDE, CALCIUM CHLORIDE 600; 310; 30; 20 MG/100ML; MG/100ML; MG/100ML; MG/100ML
50 INJECTION, SOLUTION INTRAVENOUS CONTINUOUS
Status: DISCONTINUED | OUTPATIENT
Start: 2024-09-18 | End: 2024-09-18 | Stop reason: HOSPADM

## 2024-09-18 RX ORDER — ONDANSETRON HYDROCHLORIDE 2 MG/ML
4 INJECTION, SOLUTION INTRAVENOUS ONCE
Status: COMPLETED | OUTPATIENT
Start: 2024-09-18 | End: 2024-09-18

## 2024-09-18 RX ORDER — CEFAZOLIN SODIUM 2 G/100ML
2 INJECTION, SOLUTION INTRAVENOUS ONCE
Status: COMPLETED | OUTPATIENT
Start: 2024-09-18 | End: 2024-09-18

## 2024-09-18 RX ORDER — SCOLOPAMINE TRANSDERMAL SYSTEM 1 MG/1
1 PATCH, EXTENDED RELEASE TRANSDERMAL ONCE
Status: DISCONTINUED | OUTPATIENT
Start: 2024-09-18 | End: 2024-09-18 | Stop reason: HOSPADM

## 2024-09-18 RX ORDER — PROPOFOL 10 MG/ML
INJECTION, EMULSION INTRAVENOUS AS NEEDED
Status: DISCONTINUED | OUTPATIENT
Start: 2024-09-18 | End: 2024-09-18

## 2024-09-18 RX ORDER — ONDANSETRON HYDROCHLORIDE 2 MG/ML
4 INJECTION, SOLUTION INTRAVENOUS ONCE AS NEEDED
Status: COMPLETED | OUTPATIENT
Start: 2024-09-18 | End: 2024-09-18

## 2024-09-18 RX ORDER — ACETAMINOPHEN 500 MG
1000 TABLET ORAL EVERY 6 HOURS PRN
Qty: 30 TABLET | Refills: 0 | Status: SHIPPED | OUTPATIENT
Start: 2024-09-18

## 2024-09-18 RX ORDER — KETOROLAC TROMETHAMINE 10 MG/1
10 TABLET, FILM COATED ORAL EVERY 6 HOURS PRN
Qty: 20 TABLET | Refills: 0 | Status: SHIPPED | OUTPATIENT
Start: 2024-09-18

## 2024-09-18 SDOH — HEALTH STABILITY: MENTAL HEALTH: CURRENT SMOKER: 0

## 2024-09-18 ASSESSMENT — PAIN SCALES - GENERAL
PAINLEVEL_OUTOF10: 2
PAINLEVEL_OUTOF10: 0 - NO PAIN
PAINLEVEL_OUTOF10: 2
PAINLEVEL_OUTOF10: 2
PAIN_LEVEL: 5
PAINLEVEL_OUTOF10: 5 - MODERATE PAIN
PAINLEVEL_OUTOF10: 7
PAINLEVEL_OUTOF10: 7
PAINLEVEL_OUTOF10: 2
PAINLEVEL_OUTOF10: 7
PAINLEVEL_OUTOF10: 5 - MODERATE PAIN
PAINLEVEL_OUTOF10: 2
PAINLEVEL_OUTOF10: 2
PAINLEVEL_OUTOF10: 4
PAINLEVEL_OUTOF10: 3
PAINLEVEL_OUTOF10: 2

## 2024-09-18 ASSESSMENT — COLUMBIA-SUICIDE SEVERITY RATING SCALE - C-SSRS
1. IN THE PAST MONTH, HAVE YOU WISHED YOU WERE DEAD OR WISHED YOU COULD GO TO SLEEP AND NOT WAKE UP?: NO
2. HAVE YOU ACTUALLY HAD ANY THOUGHTS OF KILLING YOURSELF?: NO
6. HAVE YOU EVER DONE ANYTHING, STARTED TO DO ANYTHING, OR PREPARED TO DO ANYTHING TO END YOUR LIFE?: NO

## 2024-09-18 ASSESSMENT — PAIN - FUNCTIONAL ASSESSMENT: PAIN_FUNCTIONAL_ASSESSMENT: 0-10

## 2024-09-18 NOTE — ANESTHESIA POSTPROCEDURE EVALUATION
Patient: Faye Fajardo    Procedure Summary       Date: 09/18/24 Room / Location: [facility] / Virtual [facility]    Anesthesia Start: 1220 Anesthesia Stop: 1323    Procedure: Desara sling placement Diagnosis:       Stress incontinence      (Stress incontinence [N39.3])    Surgeons: Wai Massey MD Responsible Provider: ADRIANNA Morris    Anesthesia Type: general ASA Status: 2            Anesthesia Type: general    Vitals Value Taken Time   /70 09/18/24 1420   Temp 36.7 °C (98 °F) 09/18/24 1410   Pulse 64 09/18/24 1422   Resp 6 09/18/24 1422   SpO2 98 % 09/18/24 1422   Vitals shown include unfiled device data.    Anesthesia Post Evaluation    Patient location during evaluation: PACU  Patient participation: complete - patient participated  Level of consciousness: awake and alert  Pain score: 5  Pain management: adequate  Airway patency: patent  Cardiovascular status: hemodynamically stable  Respiratory status: room air  Hydration status: acceptable  Postoperative Nausea and Vomiting: mild  Comments: PT'S VITAL SIGNS DO NOT REFLECT THE INTENSITY OF PAIN PROCLAIMED    There were no known notable events for this encounter.

## 2024-09-18 NOTE — OP NOTE
Desara sling placement Operative Note     Date: 2024  OR Location: [facility]    Name: Faye Fajardo, : 1970, Age: 53 y.o., MRN: [id number], Sex: female    Diagnosis  Pre-op Diagnosis      * Stress incontinence [N39.3] Post-op Diagnosis     * Stress incontinence [N39.3]     Procedures  Desara sling placement  90770 - PA SLING OPERATION STRESS INCONTINENCE      Surgeons      * Wai Massey - Primary    Resident/Fellow/Other Assistant:  Surgeons and Role:  * No surgeons found with a matching role *    Procedure Summary  Anesthesia: General  ASA: II  Anesthesia Staff: CRNA: ADRIANNA Morris  Estimated Blood Loss: 20 mL  Intra-op Medications:   Administrations occurring from 1041 to 1141 on 24:   Medication Name Total Dose   lactated Ringer's infusion 13.33 mL   scopolamine (Transderm-Scop) patch 1 patch 1 patch   acetaminophen (Tylenol) tablet 975 mg 975 mg   celecoxib (CeleBREX) capsule 400 mg 400 mg   dexAMETHasone (Decadron) injection 8 mg 8 mg   famotidine PF (Pepcid) injection 20 mg 20 mg   metoclopramide (Reglan) injection 10 mg 10 mg   ondansetron (Zofran) injection 4 mg 4 mg   sodium citrate-citric acid (Bicitra) solution 30 mL 30 mL              Anesthesia Record               Intraprocedure I/O Totals          Intake    ceFAZolin (Ancef) 2 g in dextrose (iso)  mL 100.00 mL    Total Intake 100 mL       Output    Est. Blood Loss 20 mL    Total Output 20 mL       Net    Net Volume 80 mL          Specimen: No specimens collected     Staff:   Circulator: Edna  Scrub Person: Linda  Scrub Person: Maria De Jesus         Drains and/or Catheters:   Urethral Catheter Non-latex 16 Fr. (Active)       Tourniquet Times:         Implants:     Findings: normal bladder    Indications: Faye Fajardo is an 53 y.o. female who is having surgery for Stress incontinence [N39.3].     The patient was seen in the preoperative area. The risks, benefits, complications, treatment options, non-operative  alternatives, expected recovery and outcomes were discussed with the patient. The possibilities of reaction to medication, pulmonary aspiration, injury to surrounding structures, bleeding, recurrent infection, the need for additional procedures, failure to diagnose a condition, and creating a complication requiring transfusion or operation were discussed with the patient. The patient concurred with the proposed plan, giving informed consent.  The site of surgery was properly noted/marked if necessary per policy. The patient has been actively warmed in preoperative area. Preoperative antibiotics are not indicated. Venous thrombosis prophylaxis have been ordered including bilateral sequential compression devices    Procedure Details: Two sites were identified with each site 2.5 cm. from the midline at the level of the symphysis pubis. 1% lidocaine with epinephrine was injected vaginally under the urethra in the vaginal epithelium and bilaterally in the direction of two periurethral tunnels that were about to be created for the sling.  At this point, a 2 cm. sagittal excision was performed vaginally, beginning 1 cm. beneath the urethral meatus. Sharp dissection was carried out bilaterally using Metzenbaum scissors and periurethral tunnels were created bilaterally.  A Seo catheter with a catheter guide was inserted into the bladder.   The TVT trocars were passed vaginally and retropubically with bladder deviation to the opposite side until it appeared suprapubically through a stab incision. The catheter was removed.  A cystoscopy was performed, which was entirely normal.  The bladder was drained.   The trocars were brought up entirely suprapubically and excised.  The end of the mesh was held in place with a Betty clamp.   The end of the mesh was held in place with a Betty clamp. Tension was adjusted on the mesh by drawing up on the suprapubic ends with a #8 Hegar dilator maintained between the tape and the urethra.  The plastic sheaths were removed bilaterally.  The excess mesh was excised suprapubically.  The suprapubic sites were closed with interrupted stitches of 4-0 Vicryl suture.  The vaginal incision was closed with a running stitch of 0 Vicryl suture.    Complications:  None; patient tolerated the procedure well.    Disposition: PACU - hemodynamically stable.  Condition: stable         Additional Details:     Attending Attestation: I performed the procedure.    Wai Massey  Phone Number: [phone]

## 2024-09-18 NOTE — ANESTHESIA PROCEDURE NOTES
Airway  Date/Time: 9/18/2024 12:34 PM  Urgency: elective    Airway not difficult    Staffing  Performed: CRNA   Authorized by: ADRIANNA Morris    Performed by: ADRIANNA Morris  Patient location during procedure: OR    Indications and Patient Condition  Indications for airway management: anesthesia  Sedation level: deep  Preoxygenated: yes  Patient position: sniffing  Mask difficulty assessment: 1 - vent by mask    Final Airway Details  Final airway type: supraglottic airway      Successful airway: Supraglottic airway: i gel.  Size 4     Number of attempts at approach: 1  Number of other approaches attempted: 0

## 2024-09-18 NOTE — H&P
"           HISTORY OF PRESENT ILLNESS:  This is a  53 y.o. y.o. who presents with SALAZAR here for a sling          Past Medical History  She has a past medical history of Clotting disorder (Multi), Corneal transplant status, Dental disease, PONV (postoperative nausea and vomiting), and Vision loss.    Surgical History  She has a past surgical history that includes Other surgical history (06/22/2021); Other surgical history (06/22/2021); Endometrial ablation; Dilation and curettage of uterus; and Corneal transplant (04/2005).     Social History  She reports that she has never smoked. She has never used smokeless tobacco. She reports that she does not drink alcohol and does not use drugs.    Family History  Family History   Problem Relation Name Age of Onset    Breast cancer Mother      Deep vein thrombosis Father      Coronary artery disease Father          bypass 20 plus years ago    Lymphoma Father      Heart attack Father          silent        Allergies  Augmentin [amoxicillin-pot clavulanate] and Tetracycline        A comprehensive 10+ review of systems was negative except for: see hpi                    PHYSICAL EXAMINATION:  BP Readings from Last 3 Encounters:   09/18/24 108/82   04/30/24 128/78   07/29/21 106/74      Wt Readings from Last 3 Encounters:   09/18/24 74.8 kg (165 lb)   01/23/24 74.8 kg (165 lb)   07/29/21 83.9 kg (185 lb)      BMI: Estimated body mass index is 31.18 kg/m² as calculated from the following:    Height as of this encounter: 1.549 m (5' 1\").    Weight as of this encounter: 74.8 kg (165 lb).  BSA: Estimated body surface area is 1.79 meters squared as calculated from the following:    Height as of this encounter: 1.549 m (5' 1\").    Weight as of this encounter: 74.8 kg (165 lb).  HEENT: Normocephalic, atraumatic, PER EOMI, nonicteric, trachea normal, thyroid normal, oropharynx normal.  CARDIAC: regular rate & rhythm, S1 & S2 normal.  No heaves, thrills, gallops or murmurs.  LUNGS: Clear to " auscultation, no spinal or CV tenderness.  EXTREMITIES: No evidence of cyanosis, clubbing or edema.            IMPRESSION AND PLAN:  Faye Fajardo is a 53 y.o. who presents with SALAZAR for a sling       All questions and concerns were answered and addressed.  The patient expressed understanding and agrees with the plan.     9/18/2024

## 2024-09-18 NOTE — ANESTHESIA PREPROCEDURE EVALUATION
Patient: Faye Fajardo    Procedure Information       Date/Time: 09/18/24 1041    Procedure: Desara sling placement - ~30 min for procedure    Location: [facility] / [facility]    Surgeons: Wai Massey MD            Relevant Problems   Anesthesia   (+) PONV (postoperative nausea and vomiting)       Clinical information reviewed:   Tobacco  Allergies  Meds  Problems  Med Hx  Surg Hx  OB Status    Fam Hx  Soc Hx        NPO Detail:  NPO/Void Status  Date of Last Liquid: 09/17/24  Time of Last Liquid: 2330  Date of Last Solid: 09/17/24  Time of Last Solid: 2330  Last Intake Type: Clear fluids         Physical Exam    Airway  Mallampati: III     Cardiovascular - normal exam     Dental    Pulmonary - normal exam     Abdominal          Anesthesia Plan    History of general anesthesia?: yes  History of complications of general anesthesia?: yes    ASA 2     general     The patient is not a current smoker.    Anesthetic plan and risks discussed with patient.  Use of blood products discussed with who consented to blood products.       harriet kwok

## 2024-09-19 ENCOUNTER — TELEPHONE (OUTPATIENT)
Dept: UROLOGY | Facility: CLINIC | Age: 54
End: 2024-09-19
Payer: COMMERCIAL

## 2024-09-19 DIAGNOSIS — G89.18 POSTOPERATIVE PAIN: Primary | ICD-10-CM

## 2024-09-19 RX ORDER — OXYCODONE HYDROCHLORIDE 5 MG/1
5 TABLET ORAL EVERY 6 HOURS PRN
Qty: 15 TABLET | Refills: 0 | Status: SHIPPED | OUTPATIENT
Start: 2024-09-19 | End: 2024-09-26

## 2024-09-19 NOTE — TELEPHONE ENCOUNTER
Patient called this morning, not handling her pain very well from her surgery yesterday.  Cannot sit or sleep, TraMADol  and Torodol not helping.  Is there anything else she can do or try that may be stronger  [facility]  Thank you

## 2024-10-02 ENCOUNTER — APPOINTMENT (OUTPATIENT)
Dept: UROLOGY | Facility: CLINIC | Age: 54
End: 2024-10-02
Payer: COMMERCIAL

## 2024-10-02 DIAGNOSIS — K59.09 OTHER CONSTIPATION: ICD-10-CM

## 2024-10-02 DIAGNOSIS — N39.3 SUI (STRESS URINARY INCONTINENCE, FEMALE): Primary | ICD-10-CM

## 2024-10-02 PROCEDURE — 99203 OFFICE O/P NEW LOW 30 MIN: CPT

## 2024-10-02 PROCEDURE — 1036F TOBACCO NON-USER: CPT

## 2024-10-02 NOTE — PROGRESS NOTES
[facility]  Outpatient Clinic Note    Patient: Faye Fajardo  Age/Sex: 53 y.o., female  MRN: [id number]  Virtual Visit: An interactive audio and video telecommunication system which permits real time communications between the patient (at the originating site) and provider (at the distant site) was utilized to provide this telehealth service. Verbal consent was requested and obtained from Faye Fajardo on this date 10/2/2024 for a telehealth visit.     Chief Complaint: 2-week follow-up         History of Present Illness  This is a 53 y.o. female, who presents for her 2-week follow-up.  The patient underwent surgery with Dr. Massey on 9/18/2024 for a sling. She reports she has been doing well since surgery. She is eating and drinking, as normal. Urine has remained clear, yellow. Patient's bowel movements are not everyday, she typically has constipation and continues using stool softener.. No drainage from incision site. She is ambulating at baseline. No fevers or chills.           Past Medical & Surgical History  Past Medical History:   Diagnosis Date    Clotting disorder (Multi)     MTHFR    Corneal transplant status     History of corneal transplant    Dental disease     crown    PONV (postoperative nausea and vomiting)     Vision loss      Past Surgical History:   Procedure Laterality Date    CORNEAL TRANSPLANT  04/2005    DILATION AND CURETTAGE OF UTERUS      ENDOMETRIAL ABLATION      OTHER SURGICAL HISTORY  06/22/2021    Hysterectomy    OTHER SURGICAL HISTORY  06/22/2021    c section x 3       Family History  Family History   Problem Relation Name Age of Onset    Breast cancer Mother      Deep vein thrombosis Father      Coronary artery disease Father          bypass 20 plus years ago    Lymphoma Father      Heart attack Father          silent       Social History  She reports that she has never smoked. She has never used smokeless tobacco. She reports that she does not drink alcohol and does not  use drugs.    Allergies  Augmentin [amoxicillin-pot clavulanate] and Tetracycline    Medications:  Current Outpatient Medications on File Prior to Visit   Medication Sig Dispense Refill    acetaminophen (Tylenol) 500 mg tablet Take 2 tablets (1,000 mg) by mouth every 6 hours if needed for mild pain (1 - 3). 30 tablet 0    cyanocobalamin (Vitamin B-12) 250 mcg tablet Take 1 tablet (250 mcg) by mouth 2 times a week.      docusate sodium (Colace) 100 mg capsule Take 1 capsule (100 mg) by mouth 2 times a day. 60 capsule 0    fesoterodine 8 mg tablet extended release 24 hr Take 1 tablet (8 mg) by mouth once daily. 30 tablet 11    ketorolac (Toradol) 10 mg tablet Take 1 tablet (10 mg) by mouth every 6 hours if needed for moderate pain (4 - 6). 20 tablet 0    mirabegron (Myrbetriq) 50 mg tablet extended release 24 hr 24 hr tablet Take 1 tablet (50 mg) by mouth once daily. 30 tablet 11    [] oxyCODONE (Roxicodone) 5 mg immediate release tablet Take 1 tablet (5 mg) by mouth every 6 hours if needed for severe pain (7 - 10) for up to 7 days. 15 tablet 0    polyethylene glycol (Glycolax, Miralax) 17 gram/dose powder Mix 17 g (one capful) in 6-8 ounces of liquid and drink by mouth once daily. 510 g 0    Skyrizi 150 mg/mL pen injector pen Due       traMADol (Ultram) 50 mg tablet Take 1 tablet (50 mg) by mouth every 6 hours if needed for severe pain (7 - 10) for up to 3 doses. 3 tablet 0     No current facility-administered medications on file prior to visit.        Review of Systems   A comprehensive 10+ review of systems was negative except for: see hpi          Physical Exam                                                                                                                      General: Well developed, well nourished, alert and cooperative, appears in no acute distress  Eyes: no proptosis  Lungs: Breathing is easy, non-labored while speaking in clear and complete sentences.   Neuro: alert and oriented to  person, place and time  Psych: Demonstrates good judgement and reason, without hallucinations, abnormal affect or abnormal behaviors.  Skin: no obvious lesions, no rashes      Labs  N/A    Imaging  N/A    IMPRESSION AND PLAN:  Faye Fajardo is a 53 y.o. who presents for ADEN,      ADEN, stress dominant   -UDS shows detrusor overactivity with leakage,  -Has failed oxybutynin, myrbetriq worked somewhat but was too expensive, failed fesoterodine  -sling with Dr. Massey on 9/18/2024     Constipation  -Continue using Miralax    Follow-up with Dr. Massey on 11/5    All questions and concerns were answered and addressed.  The patient expressed understanding and agrees with the plan.     Reviewed and approved by PACHECO MANZO on 10/2/24 at 7:16 AM.

## 2024-11-05 ENCOUNTER — APPOINTMENT (OUTPATIENT)
Dept: UROLOGY | Facility: CLINIC | Age: 54
End: 2024-11-05
Payer: COMMERCIAL

## 2024-11-05 DIAGNOSIS — N39.0 RECURRENT UTI: ICD-10-CM

## 2024-11-05 DIAGNOSIS — N32.81 OAB (OVERACTIVE BLADDER): Primary | ICD-10-CM

## 2024-11-05 PROCEDURE — 99211 OFF/OP EST MAY X REQ PHY/QHP: CPT | Performed by: STUDENT IN AN ORGANIZED HEALTH CARE EDUCATION/TRAINING PROGRAM

## 2024-11-05 PROCEDURE — 1036F TOBACCO NON-USER: CPT | Performed by: STUDENT IN AN ORGANIZED HEALTH CARE EDUCATION/TRAINING PROGRAM

## 2024-11-05 RX ORDER — ESTRADIOL 0.1 MG/G
CREAM VAGINAL
Qty: 42.5 G | Refills: 12 | Status: SHIPPED | OUTPATIENT
Start: 2024-11-05

## 2024-11-05 SDOH — ECONOMIC STABILITY: FOOD INSECURITY: WITHIN THE PAST 12 MONTHS, YOU WORRIED THAT YOUR FOOD WOULD RUN OUT BEFORE YOU GOT MONEY TO BUY MORE.: NEVER TRUE

## 2024-11-05 SDOH — ECONOMIC STABILITY: FOOD INSECURITY: WITHIN THE PAST 12 MONTHS, THE FOOD YOU BOUGHT JUST DIDN'T LAST AND YOU DIDN'T HAVE MONEY TO GET MORE.: NEVER TRUE

## 2024-11-05 ASSESSMENT — PATIENT HEALTH QUESTIONNAIRE - PHQ9
2. FEELING DOWN, DEPRESSED OR HOPELESS: NOT AT ALL
SUM OF ALL RESPONSES TO PHQ9 QUESTIONS 1 AND 2: 0
SUM OF ALL RESPONSES TO PHQ9 QUESTIONS 1 AND 2: 0
2. FEELING DOWN, DEPRESSED OR HOPELESS: NOT AT ALL
1. LITTLE INTEREST OR PLEASURE IN DOING THINGS: NOT AT ALL
1. LITTLE INTEREST OR PLEASURE IN DOING THINGS: NOT AT ALL

## 2024-11-05 ASSESSMENT — ENCOUNTER SYMPTOMS
LOSS OF SENSATION IN FEET: 0
OCCASIONAL FEELINGS OF UNSTEADINESS: 0
DEPRESSION: 0

## 2024-11-05 ASSESSMENT — COLUMBIA-SUICIDE SEVERITY RATING SCALE - C-SSRS
1. IN THE PAST MONTH, HAVE YOU WISHED YOU WERE DEAD OR WISHED YOU COULD GO TO SLEEP AND NOT WAKE UP?: NO
6. HAVE YOU EVER DONE ANYTHING, STARTED TO DO ANYTHING, OR PREPARED TO DO ANYTHING TO END YOUR LIFE?: NO
2. HAVE YOU ACTUALLY HAD ANY THOUGHTS OF KILLING YOURSELF?: NO

## 2024-11-05 NOTE — PROGRESS NOTES
"HISTORY OF PRESENT ILLNESS:  Faye Fajardo is a 54 y.o. female who presents today status post desara sling placement on 9/18/24. She is doing well.  She has had significant improvement in her symptoms since the sling.           Past Medical History  She has a past medical history of Clotting disorder (Multi), Corneal transplant status, Dental disease, PONV (postoperative nausea and vomiting), and Vision loss.    Surgical History  She has a past surgical history that includes Other surgical history (06/22/2021); Other surgical history (06/22/2021); Endometrial ablation; Dilation and curettage of uterus; and Corneal transplant (04/2005).     Social History  She reports that she has never smoked. She has never used smokeless tobacco. She reports that she does not drink alcohol and does not use drugs.    Family History  Family History   Problem Relation Name Age of Onset    Breast cancer Mother      Deep vein thrombosis Father      Coronary artery disease Father          bypass 20 plus years ago    Lymphoma Father      Heart attack Father          silent        Allergies  Augmentin [amoxicillin-pot clavulanate] and Tetracycline      A comprehensive 10+ review of systems was negative except for: see hpi                          PHYSICAL EXAMINATION:  BP Readings from Last 3 Encounters:   09/18/24 108/74   04/30/24 128/78   07/29/21 106/74      Wt Readings from Last 3 Encounters:   09/18/24 74.8 kg (165 lb)   01/23/24 74.8 kg (165 lb)   07/29/21 83.9 kg (185 lb)      BMI: Estimated body mass index is 31.18 kg/m² as calculated from the following:    Height as of 9/18/24: 1.549 m (5' 1\").    Weight as of 9/18/24: 74.8 kg (165 lb).  BSA: Estimated body surface area is 1.79 meters squared as calculated from the following:    Height as of 9/18/24: 1.549 m (5' 1\").    Weight as of 9/18/24: 74.8 kg (165 lb).  HEENT: Normocephalic, atraumatic, PER EOMI, nonicteric, trachea normal, thyroid normal, oropharynx normal.  CARDIAC: " regular rate & rhythm, S1 & S2 normal.  No heaves, thrills, gallops or murmurs.  LUNGS: Clear to auscultation, no spinal or CV tenderness.  EXTREMITIES: No evidence of cyanosis, clubbing or edema.               Assessment:  53 yo female who presents for ADEN,      ADEN, stress dominant:  UDS shows detrusor overactivity with leakage,  Has failed oxybutynin, myrbetriq worked somewhat but was too expensive, try fesoterodine  S/P sling, 9/18/24, doing well  Urge sx also improved    Melisa:   Rx estradiol         Follow-up in 1 year      All questions and concerns were answered and addressed.  The patient expressed understanding and agrees with the plan.     Wai Massey MD    Scribe Attestation  By signing my name below, I, La Nena Frias, Scribe   attest that this documentation has been prepared under the direction and in the presence of Wai Massey MD.

## 2024-11-26 ENCOUNTER — LAB (OUTPATIENT)
Dept: LAB | Facility: LAB | Age: 54
End: 2024-11-26
Payer: COMMERCIAL

## 2024-11-26 DIAGNOSIS — N32.81 OAB (OVERACTIVE BLADDER): ICD-10-CM

## 2024-11-26 DIAGNOSIS — N39.0 RECURRENT UTI: ICD-10-CM

## 2024-11-26 PROCEDURE — 87186 SC STD MICRODIL/AGAR DIL: CPT

## 2024-11-26 PROCEDURE — 87077 CULTURE AEROBIC IDENTIFY: CPT

## 2024-11-26 PROCEDURE — 87086 URINE CULTURE/COLONY COUNT: CPT

## 2024-11-29 LAB — BACTERIA UR CULT: ABNORMAL

## 2024-12-02 DIAGNOSIS — N39.0 ACUTE UTI: ICD-10-CM

## 2024-12-02 RX ORDER — NITROFURANTOIN 25; 75 MG/1; MG/1
100 CAPSULE ORAL 2 TIMES DAILY
Qty: 10 CAPSULE | Refills: 0 | Status: SHIPPED | OUTPATIENT
Start: 2024-12-02 | End: 2024-12-07

## 2025-01-23 ENCOUNTER — LAB (OUTPATIENT)
Dept: LAB | Facility: LAB | Age: 55
End: 2025-01-23
Payer: COMMERCIAL

## 2025-01-23 DIAGNOSIS — N39.0 RECURRENT UTI: ICD-10-CM

## 2025-01-23 DIAGNOSIS — L40.0 PSORIASIS VULGARIS: Primary | ICD-10-CM

## 2025-01-23 DIAGNOSIS — N32.81 OAB (OVERACTIVE BLADDER): ICD-10-CM

## 2025-01-23 LAB
ALBUMIN SERPL BCP-MCNC: 4.3 G/DL (ref 3.4–5)
ALP SERPL-CCNC: 97 U/L (ref 33–110)
ALT SERPL W P-5'-P-CCNC: 14 U/L (ref 7–45)
ANION GAP SERPL CALC-SCNC: 9 MMOL/L (ref 10–20)
AST SERPL W P-5'-P-CCNC: 13 U/L (ref 9–39)
BASOPHILS # BLD AUTO: 0.1 X10*3/UL (ref 0–0.1)
BASOPHILS NFR BLD AUTO: 1.3 %
BILIRUB SERPL-MCNC: 0.5 MG/DL (ref 0–1.2)
BUN SERPL-MCNC: 9 MG/DL (ref 6–23)
CALCIUM SERPL-MCNC: 9.2 MG/DL (ref 8.6–10.3)
CHLORIDE SERPL-SCNC: 103 MMOL/L (ref 98–107)
CO2 SERPL-SCNC: 28 MMOL/L (ref 21–32)
CREAT SERPL-MCNC: 0.77 MG/DL (ref 0.5–1.05)
EGFRCR SERPLBLD CKD-EPI 2021: >90 ML/MIN/1.73M*2
EOSINOPHIL # BLD AUTO: 0.28 X10*3/UL (ref 0–0.7)
EOSINOPHIL NFR BLD AUTO: 3.6 %
ERYTHROCYTE [DISTWIDTH] IN BLOOD BY AUTOMATED COUNT: 12 % (ref 11.5–14.5)
GLUCOSE SERPL-MCNC: 101 MG/DL (ref 74–99)
HCT VFR BLD AUTO: 46 % (ref 36–46)
HGB BLD-MCNC: 14.8 G/DL (ref 12–16)
IMM GRANULOCYTES # BLD AUTO: 0.01 X10*3/UL (ref 0–0.7)
IMM GRANULOCYTES NFR BLD AUTO: 0.1 % (ref 0–0.9)
LYMPHOCYTES # BLD AUTO: 1.61 X10*3/UL (ref 1.2–4.8)
LYMPHOCYTES NFR BLD AUTO: 20.6 %
MCH RBC QN AUTO: 29.4 PG (ref 26–34)
MCHC RBC AUTO-ENTMCNC: 32.2 G/DL (ref 32–36)
MCV RBC AUTO: 92 FL (ref 80–100)
MONOCYTES # BLD AUTO: 0.77 X10*3/UL (ref 0.1–1)
MONOCYTES NFR BLD AUTO: 9.8 %
NEUTROPHILS # BLD AUTO: 5.05 X10*3/UL (ref 1.2–7.7)
NEUTROPHILS NFR BLD AUTO: 64.6 %
NRBC BLD-RTO: 0 /100 WBCS (ref 0–0)
PLATELET # BLD AUTO: 321 X10*3/UL (ref 150–450)
POTASSIUM SERPL-SCNC: 4.4 MMOL/L (ref 3.5–5.3)
PROT SERPL-MCNC: 7.3 G/DL (ref 6.4–8.2)
RBC # BLD AUTO: 5.03 X10*6/UL (ref 4–5.2)
SODIUM SERPL-SCNC: 136 MMOL/L (ref 136–145)
WBC # BLD AUTO: 7.8 X10*3/UL (ref 4.4–11.3)

## 2025-01-23 PROCEDURE — 87086 URINE CULTURE/COLONY COUNT: CPT

## 2025-01-23 PROCEDURE — 80053 COMPREHEN METABOLIC PANEL: CPT

## 2025-01-23 PROCEDURE — 87186 SC STD MICRODIL/AGAR DIL: CPT

## 2025-01-23 PROCEDURE — 86481 TB AG RESPONSE T-CELL SUSP: CPT

## 2025-01-23 PROCEDURE — 36415 COLL VENOUS BLD VENIPUNCTURE: CPT

## 2025-01-23 PROCEDURE — 85025 COMPLETE CBC W/AUTO DIFF WBC: CPT

## 2025-01-23 PROCEDURE — 83721 ASSAY OF BLOOD LIPOPROTEIN: CPT

## 2025-01-23 PROCEDURE — 87077 CULTURE AEROBIC IDENTIFY: CPT

## 2025-01-24 LAB — LDLC SERPL DIRECT ASSAY-MCNC: 168 MG/DL (ref 0–129)

## 2025-01-25 LAB
NIL(NEG) CONTROL SPOT COUNT: NORMAL
PANEL A SPOT COUNT: 0
PANEL B SPOT COUNT: 0
POS CONTROL SPOT COUNT: NORMAL
T-SPOT. TB INTERPRETATION: NEGATIVE

## 2025-01-26 LAB — BACTERIA UR CULT: ABNORMAL

## 2025-01-27 DIAGNOSIS — N39.0 ACUTE UTI: ICD-10-CM

## 2025-01-27 RX ORDER — NITROFURANTOIN 25; 75 MG/1; MG/1
100 CAPSULE ORAL 2 TIMES DAILY
Qty: 10 CAPSULE | Refills: 0 | Status: SHIPPED | OUTPATIENT
Start: 2025-01-27 | End: 2025-02-01

## 2025-03-28 DIAGNOSIS — N32.81 OAB (OVERACTIVE BLADDER): ICD-10-CM

## 2025-03-28 RX ORDER — ESTRADIOL 0.1 MG/G
CREAM VAGINAL
Qty: 42 G | Refills: 12 | Status: SHIPPED | OUTPATIENT
Start: 2025-03-28

## 2025-11-11 ENCOUNTER — APPOINTMENT (OUTPATIENT)
Dept: UROLOGY | Facility: CLINIC | Age: 55
End: 2025-11-11
Payer: COMMERCIAL

## (undated) DEVICE — SLING, DESARA, BLUE TV, WITH 2.7 INTRODUCER

## (undated) DEVICE — LUBRICANT, WATER SOLUBLE, BACTERIOSTATIC, 2 OZ, STERILE

## (undated) DEVICE — COVER HANDLE LIGHT, STERIS, BLUE, STERILE

## (undated) DEVICE — IRRIGATION SET, CYSTOSCOPY, REGULATING CLAMP, STRAIGHT, 81 IN

## (undated) DEVICE — PAD, GROUNDING, ELECTROSURGICAL, W/9 FT CABLE, POLYHESIVE II, ADULT, LF

## (undated) DEVICE — PREP, SCRUB, SKIN, FOAM, HIBICLENS, 4 OZ

## (undated) DEVICE — SUTURE, VICRYL, 0, 27 IN, CT-2, UNDYED

## (undated) DEVICE — TRAY, SURESTEP, URINE METER, 16FR, COMPLETE, W/STATLOCK

## (undated) DEVICE — GOWN, ASTOUND, L

## (undated) DEVICE — TIP, SUCTION, YANKAUER, BULB, OPEN TIP, W/O CONTROL VENT, LF, CLEAR

## (undated) DEVICE — PREP TRAY, SKIN, DRY, W/GLOVES

## (undated) DEVICE — SOLUTION, IRRIGATION, SODIUM CHLORIDE 0.9%, 1000 ML, POUR BOTTLE

## (undated) DEVICE — DRAPE PACK, LAVH, W/ATTACHED LEGGINGS, W/POUCH, 100 X 114 IN, LF, STERILE

## (undated) DEVICE — GLOVE, PROTEXIS PI CLASSIC, SZ-7.5, PF, LF

## (undated) DEVICE — POSITIONING KIT, PINK PAD XL, ADVANCED TRENDELENBURG

## (undated) DEVICE — STAY SET, SURGICAL , 5MM SHARP HOOK, CS/ 50

## (undated) DEVICE — RETRACTOR, SURGICAL, RING, PLASTIC, DISPOSABLE

## (undated) DEVICE — SPONGE, GAUZE, XRAY DECT, 16 PLY, 4 X 4, W/MASTER DMT,STERILE

## (undated) DEVICE — Device